# Patient Record
Sex: FEMALE | Race: ASIAN | URBAN - METROPOLITAN AREA
[De-identification: names, ages, dates, MRNs, and addresses within clinical notes are randomized per-mention and may not be internally consistent; named-entity substitution may affect disease eponyms.]

---

## 2022-04-01 ENCOUNTER — OUTPATIENT (OUTPATIENT)
Dept: OUTPATIENT SERVICES | Facility: HOSPITAL | Age: 77
LOS: 1 days | Discharge: HOME | End: 2022-04-01
Payer: MEDICARE

## 2022-04-01 DIAGNOSIS — I67.1 CEREBRAL ANEURYSM, NONRUPTURED: ICD-10-CM

## 2022-04-01 DIAGNOSIS — E78.5 HYPERLIPIDEMIA, UNSPECIFIED: ICD-10-CM

## 2022-04-01 DIAGNOSIS — Z72.0 TOBACCO USE: ICD-10-CM

## 2022-04-01 DIAGNOSIS — I12.9 HYPERTENSIVE CHRONIC KIDNEY DISEASE WITH STAGE 1 THROUGH STAGE 4 CHRONIC KIDNEY DISEASE, OR UNSPECIFIED CHRONIC KIDNEY DISEASE: ICD-10-CM

## 2022-04-01 DIAGNOSIS — Z99.81 DEPENDENCE ON SUPPLEMENTAL OXYGEN: ICD-10-CM

## 2022-04-01 DIAGNOSIS — J44.9 CHRONIC OBSTRUCTIVE PULMONARY DISEASE, UNSPECIFIED: ICD-10-CM

## 2022-04-01 DIAGNOSIS — N18.30 CHRONIC KIDNEY DISEASE, STAGE 3 UNSPECIFIED: ICD-10-CM

## 2022-04-01 LAB
ALBUMIN SERPL ELPH-MCNC: 4.3 G/DL — SIGNIFICANT CHANGE UP (ref 3.3–5)
ALP SERPL-CCNC: 64 U/L — SIGNIFICANT CHANGE UP (ref 40–120)
ALT FLD-CCNC: 13 U/L — SIGNIFICANT CHANGE UP (ref 10–45)
ANION GAP SERPL CALC-SCNC: 13 MMOL/L — SIGNIFICANT CHANGE UP (ref 5–17)
APTT BLD: 32.4 SEC — SIGNIFICANT CHANGE UP (ref 27.5–35.5)
AST SERPL-CCNC: 23 U/L — SIGNIFICANT CHANGE UP (ref 10–40)
BILIRUB SERPL-MCNC: 0.6 MG/DL — SIGNIFICANT CHANGE UP (ref 0.2–1.2)
BUN SERPL-MCNC: 14 MG/DL — SIGNIFICANT CHANGE UP (ref 7–23)
CALCIUM SERPL-MCNC: 9.2 MG/DL — SIGNIFICANT CHANGE UP (ref 8.4–10.5)
CHLORIDE SERPL-SCNC: 103 MMOL/L — SIGNIFICANT CHANGE UP (ref 96–108)
CO2 SERPL-SCNC: 22 MMOL/L — SIGNIFICANT CHANGE UP (ref 22–31)
CREAT SERPL-MCNC: 0.69 MG/DL — SIGNIFICANT CHANGE UP (ref 0.5–1.3)
EGFR: 90 ML/MIN/1.73M2 — SIGNIFICANT CHANGE UP
GLUCOSE SERPL-MCNC: 116 MG/DL — HIGH (ref 70–99)
HCT VFR BLD CALC: 39 % — SIGNIFICANT CHANGE UP (ref 34.5–45)
HGB BLD-MCNC: 13.2 G/DL — SIGNIFICANT CHANGE UP (ref 11.5–15.5)
INR BLD: 0.95 — SIGNIFICANT CHANGE UP (ref 0.88–1.16)
MCHC RBC-ENTMCNC: 31.4 PG — SIGNIFICANT CHANGE UP (ref 27–34)
MCHC RBC-ENTMCNC: 33.8 GM/DL — SIGNIFICANT CHANGE UP (ref 32–36)
MCV RBC AUTO: 92.6 FL — SIGNIFICANT CHANGE UP (ref 80–100)
NRBC # BLD: 0 /100 WBCS — SIGNIFICANT CHANGE UP (ref 0–0)
PLATELET # BLD AUTO: 296 K/UL — SIGNIFICANT CHANGE UP (ref 150–400)
POTASSIUM SERPL-MCNC: 3.6 MMOL/L — SIGNIFICANT CHANGE UP (ref 3.5–5.3)
POTASSIUM SERPL-SCNC: 3.6 MMOL/L — SIGNIFICANT CHANGE UP (ref 3.5–5.3)
PROT SERPL-MCNC: 6.9 G/DL — SIGNIFICANT CHANGE UP (ref 6–8.3)
PROTHROM AB SERPL-ACNC: 11.3 SEC — SIGNIFICANT CHANGE UP (ref 10.5–13.4)
RBC # BLD: 4.21 M/UL — SIGNIFICANT CHANGE UP (ref 3.8–5.2)
RBC # FLD: 12.7 % — SIGNIFICANT CHANGE UP (ref 10.3–14.5)
SODIUM SERPL-SCNC: 138 MMOL/L — SIGNIFICANT CHANGE UP (ref 135–145)
WBC # BLD: 4.94 K/UL — SIGNIFICANT CHANGE UP (ref 3.8–10.5)
WBC # FLD AUTO: 4.94 K/UL — SIGNIFICANT CHANGE UP (ref 3.8–10.5)

## 2022-04-01 PROCEDURE — 93010 ELECTROCARDIOGRAM REPORT: CPT

## 2022-04-01 RX ORDER — SODIUM CHLORIDE 9 MG/ML
1000 INJECTION INTRAMUSCULAR; INTRAVENOUS; SUBCUTANEOUS
Refills: 0 | Status: DISCONTINUED | OUTPATIENT
Start: 2022-04-01 | End: 2022-04-15

## 2022-04-01 RX ORDER — CHLORHEXIDINE GLUCONATE 213 G/1000ML
1 SOLUTION TOPICAL ONCE
Refills: 0 | Status: DISCONTINUED | OUTPATIENT
Start: 2022-04-01 | End: 2022-04-15

## 2022-04-01 RX ADMIN — SODIUM CHLORIDE 75 MILLILITER(S): 9 INJECTION INTRAMUSCULAR; INTRAVENOUS; SUBCUTANEOUS at 15:29

## 2022-04-01 NOTE — PROGRESS NOTE ADULT - SUBJECTIVE AND OBJECTIVE BOX
Procedure: Cerebral angiogram  Doctor: Dr. Abraham  Consent: Signed by: patient                   NAME/NUMBER if not patient:     SUBJECTIVE:  77 y/o female with pmhx HTN, HLD, brain aneurysm presenting for cerebral angiogram.  Pt reports that she had a routine brain imaging , found to have a brain aneurysm. She presents today for diagnostic cerebral angiogram. Denies headache, weakness, visual changes, gait instability.    PMHx: as above  PSHx: cholecystectomy  Social Hx: smokes 1 cig/day, no alcohol, no drug use  Medications: HCTZ 25mg daily, Lovastatin 20mg daily   Allergies: NKDA    T(C): --  HR: --  BP: --  RR: --  SpO2: --  Wt(kg): --    EXAM:  Neurological: AOx3, NAD, FC, speech coherent   CN II-XII: EOMI, PERRL, face symmetric, tongue midline   Motor: MAEx4 5/5 UE and LE B/L   SILT throughout  WWP throughout   No pronator drift   Cardiovascular: normal rate and rhythm  Respiratory: unlabored breathing on RA   Gastrointestinal: abd soft, NT, ND   Extremities: no LE edema, DP pulses intact    04-01    138  |  103  |  14  ----------------------------<  116<H>  3.6   |  22  |  0.69    Ca    9.2      01 Apr 2022 10:38    TPro  6.9  /  Alb  4.3  /  TBili  0.6  /  DBili  x   /  AST  23  /  ALT  13  /  AlkPhos  64  04-01    CBC Full  -  ( 01 Apr 2022 09:59 )  WBC Count : 4.94 K/uL  RBC Count : 4.21 M/uL  Hemoglobin : 13.2 g/dL  Hematocrit : 39.0 %  Platelet Count - Automated : 296 K/uL  Mean Cell Volume : 92.6 fl  Mean Cell Hemoglobin : 31.4 pg  Mean Cell Hemoglobin Concentration : 33.8 gm/dL  Auto Neutrophil # : x  Auto Lymphocyte # : x  Auto Monocyte # : x  Auto Eosinophil # : x  Auto Basophil # : x  Auto Neutrophil % : x  Auto Lymphocyte % : x  Auto Monocyte % : x  Auto Eosinophil % : x  Auto Basophil % : x    PT/INR - ( 01 Apr 2022 09:59 )   PT: 11.3 sec;   INR: 0.95          PTT - ( 01 Apr 2022 09:59 )  PTT:32.4 sec    Pregnancy test (serum hcg for any female under 56, must be resulted day before OR): [ ] Negative Result  [ ] Positive Result  [X ] N/A : male or female>57 y/o      COVID swab (in past 72hrs): X Y  _N - negative    CXR: n/a  EKG: NSR no acute ST changes  ECHO if available: n/a  Medical Clearances: see paper chart    Heparin drip:               If yes --> Needs to be held 2 hours prior to angiogram, order placed: []yes   []no, primary team aware []yes   [X]no   []n/a    Contrast allergy: [] yes   [] no  If yes --> premedication ordered []y []n    Implanted Devices (pacemaker, drug pump...etc):  []YES   [] NO                  If yes --> EPS consulted to interrogate device: [ ] YES  [ ] NO                            If yes -->  EPS called to let them know patient going for procedure: [ ] device needs to be turned off                                                                                                                                                 [ ] magnet needs to be placed for surgery                                                                                                                                                [ ] nothing to do per EP, may proceed with cautery use in OR                                       Assessment:  77 y/o female with pmhx HTN, HLD, brain aneurysm presenting for cerebral angiogram.    Plan:    - Consent for cerebral angiogram obtained and in chart, all risks, benefits and alternatives discussed including risk of bleeding at access site, infection, spinal headache, stroke, vessel dissection  - NPO/IVF  - Return to cath lab recovery post procedure     Assessment:  Present when checked    []  GCS  E   V  M     Heart Failure: []Acute, [] acute on chronic , []chronic  Heart Failure:  [] Diastolic (HFpEF), [] Systolic (HFrEF), []Combined (HFpEF and HFrEF), [] RHF, [] Pulm HTN, [] Other    [] NEEL, [] ATN, [] AIN, [] other  [] CKD1, [] CKD2, [] CKD 3, [] CKD 4, [] CKD 5, []ESRD    Encephalopathy: [] Metabolic, [] Hepatic, [] toxic, [] Neurological, [] Other    Abnormal Nurtitional Status: [] malnurtition (see nutrition note), [ ]underweight: BMI < 19, [] morbid obesity: BMI >40, [] Cachexia    [] Sepsis  [] hypovolemic shock,[] cardiogenic shock, [] hemorrhagic shock, [] neuogenic shock  [] Acute Respiratory Failure  []Cerebral edema, [] Brain compression/ herniation,   [] Functional quadriplegia  [] Acute blood loss anemia   Procedure: Cerebral angiogram  Doctor: Dr. Abraham  Consent: Signed by: patient                   NAME/NUMBER if not patient:     SUBJECTIVE:  77 y/o female with pmhx HTN, HLD, brain aneurysm presenting for cerebral angiogram.  Daughter states that mother has been losing her memory. Went to PCP and got MRI/MRA head and found to have a brain aneurysm. She presents today for diagnostic cerebral angiogram. Denies headache, weakness, visual changes, gait instability.     PMHx: as above  PSHx: cholecystectomy  Social Hx: smokes 1 cig/day, no alcohol, no drug use  Medications: HCTZ 25mg daily, Lovastatin 20mg daily   Allergies: NKDA    T(C): --  HR: --  BP: --  RR: --  SpO2: --  Wt(kg): --    EXAM:  Neurological: AOx3, NAD, FC, speech coherent   CN II-XII: EOMI, PERRL, face symmetric, tongue midline   Motor: MAEx4 5/5 UE and LE B/L   SILT throughout  WWP throughout   No pronator drift   Cardiovascular: normal rate and rhythm  Respiratory: unlabored breathing on RA   Gastrointestinal: abd soft, NT, ND   Extremities: no LE edema, DP pulses intact    04-01    138  |  103  |  14  ----------------------------<  116<H>  3.6   |  22  |  0.69    Ca    9.2      01 Apr 2022 10:38    TPro  6.9  /  Alb  4.3  /  TBili  0.6  /  DBili  x   /  AST  23  /  ALT  13  /  AlkPhos  64  04-01    CBC Full  -  ( 01 Apr 2022 09:59 )  WBC Count : 4.94 K/uL  RBC Count : 4.21 M/uL  Hemoglobin : 13.2 g/dL  Hematocrit : 39.0 %  Platelet Count - Automated : 296 K/uL  Mean Cell Volume : 92.6 fl  Mean Cell Hemoglobin : 31.4 pg  Mean Cell Hemoglobin Concentration : 33.8 gm/dL  Auto Neutrophil # : x  Auto Lymphocyte # : x  Auto Monocyte # : x  Auto Eosinophil # : x  Auto Basophil # : x  Auto Neutrophil % : x  Auto Lymphocyte % : x  Auto Monocyte % : x  Auto Eosinophil % : x  Auto Basophil % : x    PT/INR - ( 01 Apr 2022 09:59 )   PT: 11.3 sec;   INR: 0.95          PTT - ( 01 Apr 2022 09:59 )  PTT:32.4 sec    Pregnancy test (serum hcg for any female under 56, must be resulted day before OR): [ ] Negative Result  [ ] Positive Result  [X ] N/A : male or female>57 y/o      COVID swab (in past 72hrs): X Y  _N - negative    CXR: n/a  EKG: NSR no acute ST changes  ECHO if available: n/a  Medical Clearances: see paper chart    Heparin drip:               If yes --> Needs to be held 2 hours prior to angiogram, order placed: []yes   []no, primary team aware []yes   [X]no   []n/a    Contrast allergy: [] yes   [] no  If yes --> premedication ordered []y []n    Implanted Devices (pacemaker, drug pump...etc):  []YES   [] NO                  If yes --> EPS consulted to interrogate device: [ ] YES  [ ] NO                            If yes -->  EPS called to let them know patient going for procedure: [ ] device needs to be turned off                                                                                                                                                 [ ] magnet needs to be placed for surgery                                                                                                                                                [ ] nothing to do per EP, may proceed with cautery use in OR                                       Assessment:  77 y/o female with pmhx HTN, HLD, brain aneurysm presenting for cerebral angiogram.    Plan:    - Consent for cerebral angiogram obtained and in chart, all risks, benefits and alternatives discussed including risk of bleeding at access site, infection, spinal headache, stroke, vessel dissection  - NPO/IVF  - Return to cath lab recovery post procedure     Assessment:  Present when checked    []  GCS  E   V  M     Heart Failure: []Acute, [] acute on chronic , []chronic  Heart Failure:  [] Diastolic (HFpEF), [] Systolic (HFrEF), []Combined (HFpEF and HFrEF), [] RHF, [] Pulm HTN, [] Other    [] NEEL, [] ATN, [] AIN, [] other  [] CKD1, [] CKD2, [] CKD 3, [] CKD 4, [] CKD 5, []ESRD    Encephalopathy: [] Metabolic, [] Hepatic, [] toxic, [] Neurological, [] Other    Abnormal Nurtitional Status: [] malnurtition (see nutrition note), [ ]underweight: BMI < 19, [] morbid obesity: BMI >40, [] Cachexia    [] Sepsis  [] hypovolemic shock,[] cardiogenic shock, [] hemorrhagic shock, [] neuogenic shock  [] Acute Respiratory Failure  []Cerebral edema, [] Brain compression/ herniation,   [] Functional quadriplegia  [] Acute blood loss anemia

## 2022-04-01 NOTE — BRIEF OPERATIVE NOTE - OPERATION/FINDINGS
Patient was brought to the angio suite, placed on x-ray table, placed on standard monitor by anesthesiologist. B/l groins were prepped and draped in usual sterile fashion.   5 [place] short sheath was used in the right common femoral artery for access. A diagnostic angiogram was performed under monitored anesthesia care. B/l ICAs and left vertebral artery were injected and studied.     Findings:   There is an Acomm aneurysm measuring 2.25x1.54mm with a neck of 2.30mm. 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     Full report to follow  Patient tolerated the procedure well and at baseline neurological condition.   Right groin vascular access site was closed with exoseal and applied manual compression.   There is no hematoma.   Patient was transferred to cath lab recovery and will return home later today.     Above discussed with Dr. Abraham

## 2022-04-02 PROCEDURE — C1887: CPT

## 2022-04-02 PROCEDURE — C1889: CPT

## 2022-04-02 PROCEDURE — C1769: CPT

## 2022-04-02 PROCEDURE — 36226 PLACE CATH VERTEBRAL ART: CPT | Mod: LT

## 2022-04-02 PROCEDURE — C1894: CPT

## 2022-04-02 PROCEDURE — 85730 THROMBOPLASTIN TIME PARTIAL: CPT

## 2022-04-02 PROCEDURE — 36224 PLACE CATH CAROTD ART: CPT | Mod: 50

## 2022-04-02 PROCEDURE — 85610 PROTHROMBIN TIME: CPT

## 2022-04-02 PROCEDURE — 85027 COMPLETE CBC AUTOMATED: CPT

## 2022-04-02 PROCEDURE — 93005 ELECTROCARDIOGRAM TRACING: CPT

## 2022-04-02 PROCEDURE — 80053 COMPREHEN METABOLIC PANEL: CPT

## 2022-04-02 PROCEDURE — C1760: CPT
